# Patient Record
Sex: FEMALE | Race: BLACK OR AFRICAN AMERICAN | NOT HISPANIC OR LATINO | Employment: STUDENT | URBAN - METROPOLITAN AREA
[De-identification: names, ages, dates, MRNs, and addresses within clinical notes are randomized per-mention and may not be internally consistent; named-entity substitution may affect disease eponyms.]

---

## 2023-08-14 ENCOUNTER — OFFICE VISIT (OUTPATIENT)
Dept: URGENT CARE | Facility: URGENT CARE | Age: 33
End: 2023-08-14
Payer: COMMERCIAL

## 2023-08-14 VITALS
RESPIRATION RATE: 16 BRPM | HEART RATE: 95 BPM | OXYGEN SATURATION: 99 % | DIASTOLIC BLOOD PRESSURE: 76 MMHG | SYSTOLIC BLOOD PRESSURE: 116 MMHG | HEIGHT: 62 IN | WEIGHT: 225 LBS | TEMPERATURE: 98.3 F | BODY MASS INDEX: 41.41 KG/M2

## 2023-08-14 DIAGNOSIS — L29.9 ITCHY SKIN: ICD-10-CM

## 2023-08-14 DIAGNOSIS — Z01.89 PATIENT REQUEST FOR DIAGNOSTIC TESTING: Primary | ICD-10-CM

## 2023-08-14 PROCEDURE — 99204 OFFICE O/P NEW MOD 45 MIN: CPT | Performed by: PHYSICIAN ASSISTANT

## 2023-08-14 RX ORDER — CETIRIZINE HYDROCHLORIDE 10 MG/1
10 TABLET ORAL DAILY
Qty: 30 TABLET | Refills: 0 | Status: SHIPPED | OUTPATIENT
Start: 2023-08-14 | End: 2023-10-09

## 2023-08-14 ASSESSMENT — ENCOUNTER SYMPTOMS
FEVER: 0
VOMITING: 0
DIARRHEA: 0
SORE THROAT: 0
ABDOMINAL PAIN: 0
NAUSEA: 0
FATIGUE: 0
CHILLS: 0
SHORTNESS OF BREATH: 0

## 2023-08-15 ENCOUNTER — LAB (OUTPATIENT)
Dept: LAB | Facility: CLINIC | Age: 33
End: 2023-08-15
Payer: COMMERCIAL

## 2023-08-15 DIAGNOSIS — Z01.89 PATIENT REQUEST FOR DIAGNOSTIC TESTING: ICD-10-CM

## 2023-08-15 LAB
HOLD SPECIMEN: NORMAL
TSH SERPL DL<=0.005 MIU/L-ACNC: 1.68 UIU/ML (ref 0.3–4.2)

## 2023-08-15 PROCEDURE — 84443 ASSAY THYROID STIM HORMONE: CPT

## 2023-08-15 PROCEDURE — 36415 COLL VENOUS BLD VENIPUNCTURE: CPT

## 2023-10-09 ENCOUNTER — OFFICE VISIT (OUTPATIENT)
Dept: FAMILY MEDICINE | Facility: CLINIC | Age: 33
End: 2023-10-09
Payer: COMMERCIAL

## 2023-10-09 VITALS
RESPIRATION RATE: 18 BRPM | SYSTOLIC BLOOD PRESSURE: 98 MMHG | TEMPERATURE: 97.6 F | HEIGHT: 62 IN | HEART RATE: 105 BPM | WEIGHT: 241.7 LBS | BODY MASS INDEX: 44.48 KG/M2 | OXYGEN SATURATION: 98 % | DIASTOLIC BLOOD PRESSURE: 68 MMHG

## 2023-10-09 DIAGNOSIS — B07.0 PLANTAR WART OF RIGHT FOOT: Primary | ICD-10-CM

## 2023-10-09 DIAGNOSIS — Z12.4 SCREENING FOR CERVICAL CANCER: ICD-10-CM

## 2023-10-09 DIAGNOSIS — E66.01 SEVERE OBESITY (BMI >= 40) (H): ICD-10-CM

## 2023-10-09 PROCEDURE — 17110 DESTRUCTION B9 LES UP TO 14: CPT | Performed by: PHYSICIAN ASSISTANT

## 2023-10-09 ASSESSMENT — ENCOUNTER SYMPTOMS: FATIGUE: 1

## 2023-10-09 ASSESSMENT — PAIN SCALES - GENERAL: PAINLEVEL: SEVERE PAIN (7)

## 2023-10-09 NOTE — PROGRESS NOTES
"  Assessment & Plan     (B07.0) Plantar wart of right foot  (primary encounter diagnosis)  Comment:   Plan: DESTRUCT BENIGN LESION, UP TO 14        See procedure note  Etiology discussed today. Treatment options, medication side effects and expected course of recovery reviewed. If any worsening of symptoms, please contact the clinical team sooner, otherwise follow up as discussed      (Z12.4) Screening for cervical cancer  Comment:   Plan: Ob/Gyn Referral        Prefers to see gynecology and female provider for cervical cancer screening, referral placed today             BMI:   Estimated body mass index is 43.97 kg/m  as calculated from the following:    Height as of this encounter: 1.579 m (5' 2.17\").    Weight as of this encounter: 109.6 kg (241 lb 11.2 oz).           Benji Crystal PA-C  [facility]   Tatum is a 33 year old, presenting for the following health issues:  Derm Problem (2, Bottom of right foot, painful to walk), Dizziness (Started last week, \"happens when getting up out of bed\"), and Fatigue      10/9/2023     9:34 AM   Additional Questions   Roomed by Raya MANCUSO RN   Accompanied by None         10/9/2023     9:34 AM   Patient Reported Additional Medications   Patient reports taking the following new medications None       Fatigue  Associated symptoms include fatigue.   History of Present Illness       Reason for visit:  My feet and iching  Symptom onset:  More than a month  Symptoms include:  Feet pain  Symptom intensity:  Moderate  Symptom progression:  Staying the same  Had these symptoms before:  No  What makes it worse:  Water  What makes it better:  Siting    She eats 2-3 servings of fruits and vegetables daily.She consumes 2 sweetened beverage(s) daily.She exercises with enough effort to increase her heart rate 9 or less minutes per day.  She exercises with enough effort to increase her heart rate 3 or less days per week.   She is taking " "medications regularly.       Patient reports itching that she was seen for in August has resolved with the use of cetirizine, she noted occasional dizziness when she was taking the medication but otherwise no side effects.  Patient's main concern today is pain in the bottom of her right foot, patient notes that this has been present for greater than 1 month, she was given over-the-counter wart treatment from a pharmacy and noting no improvement in her symptoms, wondering about the next steps for treatment              Review of Systems   Constitutional:  Positive for fatigue.            Objective    BP 98/68 (BP Location: Right arm, Patient Position: Sitting, Cuff Size: Adult Large)   Pulse 105   Temp 97.6  F (36.4  C) (Temporal)   Resp 18   Ht 1.579 m (5' 2.17\")   Wt 109.6 kg (241 lb 11.2 oz)   LMP 10/06/2023 (Exact Date)   SpO2 98%   BMI 43.97 kg/m    Body mass index is 43.97 kg/m .  Physical Exam   GENERAL: healthy, alert and no distress  EYES: Eyes grossly normal to inspection, EOM intact and conjunctivae normal  RESP: breathing comfortably on room air  PSYCH: mentation appears normal, affect normal/bright  SKIN: 2 plantar warts on R foot, no callous, mild TTP, no erythema    WART REMOVAL PROCEDURE NOTE:  The warts were frozen with liquid nitrogen with freeze/thaw cycles x3. Patient tolerated procedure well. Wart care discussed including use of OTC products starting in a few days. Recommended gentle abrasion with pumice stone with good handwashing afterward. Call or message if any signs of infection. RTC in 3-4 weeks for refreezing if needed.                        "

## 2024-03-11 ENCOUNTER — OFFICE VISIT (OUTPATIENT)
Dept: MIDWIFE SERVICES | Facility: CLINIC | Age: 34
End: 2024-03-11
Payer: COMMERCIAL

## 2024-03-11 VITALS
SYSTOLIC BLOOD PRESSURE: 104 MMHG | WEIGHT: 228 LBS | DIASTOLIC BLOOD PRESSURE: 66 MMHG | HEIGHT: 62 IN | HEART RATE: 68 BPM | BODY MASS INDEX: 41.96 KG/M2

## 2024-03-11 DIAGNOSIS — Z01.419 WELL WOMAN EXAM: Primary | ICD-10-CM

## 2024-03-11 PROCEDURE — G0145 SCR C/V CYTO,THINLAYER,RESCR: HCPCS | Performed by: MIDWIFE

## 2024-03-11 PROCEDURE — 87624 HPV HI-RISK TYP POOLED RSLT: CPT | Performed by: MIDWIFE

## 2024-03-11 PROCEDURE — 99385 PREV VISIT NEW AGE 18-39: CPT | Performed by: MIDWIFE

## 2024-03-11 PROCEDURE — 87591 N.GONORRHOEAE DNA AMP PROB: CPT | Performed by: MIDWIFE

## 2024-03-11 PROCEDURE — 87491 CHLMYD TRACH DNA AMP PROBE: CPT | Performed by: MIDWIFE

## 2024-03-11 ASSESSMENT — PATIENT HEALTH QUESTIONNAIRE - PHQ9: SUM OF ALL RESPONSES TO PHQ QUESTIONS 1-9: 4

## 2024-03-11 NOTE — PROGRESS NOTES
"Annual Health Maintenance Exam    Subjective:     Tatum is a 33 year old  female who presents for an annual exam.  She is a new patient to the [facility]. She reports no ongoing health concerns.  She is requesting a pap smear today as well as well woman exam.  She reports menstrual cycles every 21-26 days, bleeding \"average\" amount with no blood clots.  She is not currently utilizing contraception for preventing pregnancy, however notes she has not been sexually active in over a year. She does not desire pregnancy and does not desire birth control today.  She reports she is walking 2-3 times a week for about 10-20 minutes per day.  She uses alcohol once per year.  She has noted dryness and mild itching on her areolas bilaterally.  She uses vasoline for treatment which has improved her symptoms. She did breastfeed her son in  for 1 1/2 years.  She denies any skin changes, architecture changes with breasts bilaterally.      Allergies  Amoxicillin and Food    Current Medications  No current outpatient medications on file.       Past Medical/Surgical History  No past medical history on file.  Past Surgical History:   Procedure Laterality Date     SECTION      CHOLECYSTECTOMY         Obstetric and Gynecologic History  Patient's last menstrual period was 2024.    OB History    Para Term  AB Living   1 1 1 0 0 1   SAB IAB Ectopic Multiple Live Births   0 0 0 0 1      # Outcome Date GA Lbr Naresh/2nd Weight Sex Delivery Anes PTL Lv   1 Term     M CS-LVertical  N LUIS       Last Pap: 2015, [facility].  Results were: negative.  Last mammogram: NA.    Immunization History  Status updated.    Family History  Family History   Problem Relation Age of Onset    No Known Problems Mother     No Known Problems Father        Health Maintenance  Diet:  general  Regular Exercise: No.  Walks 2-3 times per week..    Caffeine use:  yes  Sunscreen Use: No.   Dental Visits Regularly: " No  Seat Belt: No  Self Breast Exam Monthly:No  Abuse History:  Does not have a history of abuse.  Currently does feel safe in all her relationships.      Social History  Social History     Socioeconomic History    Marital status: Single     Spouse name: Not on file    Number of children: Not on file    Years of education: Not on file    Highest education level: Not on file   Occupational History    Not on file   Tobacco Use    Smoking status: Never     Passive exposure: Never    Smokeless tobacco: Never   Vaping Use    Vaping Use: Never used   Substance and Sexual Activity    Alcohol use: Yes     Comment: rarely--once a year, maybe    Drug use: Never    Sexual activity: Not Currently     Birth control/protection: Abstinence   Other Topics Concern    Not on file   Social History Narrative    Not on file     Social Determinants of Health     Financial Resource Strain: Low Risk  (10/9/2023)    Financial Resource Strain     Within the past 12 months, have you or your family members you live with been unable to get utilities (heat, electricity) when it was really needed?: No   Food Insecurity: Low Risk  (10/9/2023)    Food Insecurity     Within the past 12 months, did you worry that your food would run out before you got money to buy more?: No     Within the past 12 months, did the food you bought just not last and you didn t have money to get more?: No   Transportation Needs: Low Risk  (10/9/2023)    Transportation Needs     Within the past 12 months, has lack of transportation kept you from medical appointments, getting your medicines, non-medical meetings or appointments, work, or from getting things that you need?: No   Physical Activity: Not on file   Stress: Not on file   Social Connections: Not on file   Interpersonal Safety: Low Risk  (3/11/2024)    Interpersonal Safety     Do you feel physically and emotionally safe where you currently live?: Yes     Within the past 12 months, have you been hit, slapped, kicked  "or otherwise physically hurt by someone?: No     Within the past 12 months, have you been humiliated or emotionally abused in other ways by your partner or ex-partner?: No   Housing Stability: Low Risk  (10/9/2023)    Housing Stability     Do you have housing? : Yes     Are you worried about losing your housing?: No       Further Screening History  Colonoscopy: NA.  Lipid Profile: NA.  Glucose Screen: 96 in 12/2022.  Last Dexa: NA.    Review of Systems  A 12 point comprehensive review of systems was negative except as noted.    Objective:     Vitals:    03/11/24 0942   BP: 104/66   Pulse: 68   Weight: 103.4 kg (228 lb)   Height: 1.562 m (5' 1.5\")       Physical Exam:  General: Pleasant, articulate, well-groomed, well-nourished female.  Not in any apparent distress.  Neck: Supple.  Thyroid: Small, symmetrical, no nodules noted.  Lymphadenopathy: Negative.  Lungs: Clear to auscultation bilaterally, and a nonlabored breathing pattern.  Cardio: Regular rate and rhythm, negative for murmur.   Breasts: Symmetrical, nontender, no masses, negative lymphadenopathy, negative nipple discharge.  Inspection of areola bilaterally reveals normal texture and skin.  No lesions, puckering or dimpling noted.  Abdomen: Soft, nontender, no masses, negative CVAT.  Difficult to palpate 2/2 body habitus.  External genitalia: Normal hair distribution, no lesions.  Urethral opening: Without lesions, or tenderness.   Bladder: Without masses, or tenderness.  Vagina: Pink, rugated, normal-appearing discharge.  Cervix: posterior, pink, smooth, no lesions.  Pap smear was obtained.  Bimanual: Finds uterus small anteverted, mobile, nontender, no masses.  Negative CMT.  Adnexa: without masses or tenderness.    Lower extremities: +1 reflexes, no significant edema.      Assessment / Plan:     1) Annual health maintenance exam generally within normal limits today.  Pap smear collected.  2) Breast exam, normal findings    1. Discussed nutrition and " exercise.  Advised MVI, Vitamin D3 4000IU geltab and an omega 3 supplement daily.  Accepts the following HM labs: none today.   Breast self exam technique reviewed and patient encouraged to perform self-exam monthly. Contraception: abstaining.  Next pap due pending results from today's collection.  2. Reviewed care of nipples and skin on breast.  May consider using Aquaphor if experiencing dry skin on breast or nipples.  Danger s/sx of changes in breast tissue reviewed extensively.  3.  RTC 1 year for annual physical exam, or PRN.    40 minutes on the date of the encounter doing chart review, patient visit, and documentation

## 2024-03-12 LAB
C TRACH DNA SPEC QL PROBE+SIG AMP: NEGATIVE
N GONORRHOEA DNA SPEC QL NAA+PROBE: NEGATIVE

## 2024-03-13 LAB
BKR LAB AP GYN ADEQUACY: NORMAL
BKR LAB AP GYN INTERPRETATION: NORMAL
BKR LAB AP HPV REFLEX: NORMAL
BKR LAB AP LMP: NORMAL
BKR LAB AP PREVIOUS ABNORMAL: NORMAL
PATH REPORT.COMMENTS IMP SPEC: NORMAL
PATH REPORT.COMMENTS IMP SPEC: NORMAL
PATH REPORT.RELEVANT HX SPEC: NORMAL

## 2024-03-14 LAB
HUMAN PAPILLOMA VIRUS 16 DNA: NEGATIVE
HUMAN PAPILLOMA VIRUS 18 DNA: NEGATIVE
HUMAN PAPILLOMA VIRUS FINAL DIAGNOSIS: NORMAL
HUMAN PAPILLOMA VIRUS OTHER HR: NEGATIVE

## 2025-04-27 ENCOUNTER — HEALTH MAINTENANCE LETTER (OUTPATIENT)
Age: 35
End: 2025-04-27